# Patient Record
Sex: FEMALE | Race: WHITE | NOT HISPANIC OR LATINO | Employment: UNEMPLOYED | URBAN - METROPOLITAN AREA
[De-identification: names, ages, dates, MRNs, and addresses within clinical notes are randomized per-mention and may not be internally consistent; named-entity substitution may affect disease eponyms.]

---

## 2019-01-07 ENCOUNTER — APPOINTMENT (OUTPATIENT)
Dept: RADIOLOGY | Facility: MEDICAL CENTER | Age: 29
End: 2019-01-07
Attending: EMERGENCY MEDICINE
Payer: MEDICARE

## 2019-01-07 ENCOUNTER — HOSPITAL ENCOUNTER (EMERGENCY)
Facility: MEDICAL CENTER | Age: 29
End: 2019-01-07
Attending: EMERGENCY MEDICINE
Payer: MEDICARE

## 2019-01-07 VITALS
BODY MASS INDEX: 28.64 KG/M2 | TEMPERATURE: 97 F | WEIGHT: 167.77 LBS | HEART RATE: 75 BPM | DIASTOLIC BLOOD PRESSURE: 85 MMHG | RESPIRATION RATE: 18 BRPM | OXYGEN SATURATION: 97 % | SYSTOLIC BLOOD PRESSURE: 128 MMHG | HEIGHT: 64 IN

## 2019-01-07 DIAGNOSIS — R55 SYNCOPE, UNSPECIFIED SYNCOPE TYPE: ICD-10-CM

## 2019-01-07 DIAGNOSIS — S00.83XA CONTUSION OF FACE, INITIAL ENCOUNTER: ICD-10-CM

## 2019-01-07 LAB — HCG SERPL QL: NEGATIVE

## 2019-01-07 PROCEDURE — 70450 CT HEAD/BRAIN W/O DYE: CPT

## 2019-01-07 PROCEDURE — 99284 EMERGENCY DEPT VISIT MOD MDM: CPT | Mod: 25

## 2019-01-07 PROCEDURE — 84703 CHORIONIC GONADOTROPIN ASSAY: CPT

## 2019-01-07 PROCEDURE — 70486 CT MAXILLOFACIAL W/O DYE: CPT

## 2019-01-07 RX ORDER — ONDANSETRON 4 MG/1
4 TABLET, ORALLY DISINTEGRATING ORAL ONCE
Status: DISCONTINUED | OUTPATIENT
Start: 2019-01-07 | End: 2019-01-07 | Stop reason: HOSPADM

## 2019-01-07 RX ORDER — QUETIAPINE FUMARATE 50 MG/1
50 TABLET, FILM COATED ORAL
COMMUNITY

## 2019-01-07 RX ORDER — SERTRALINE HYDROCHLORIDE 100 MG/1
100 TABLET, FILM COATED ORAL DAILY
COMMUNITY

## 2019-01-07 RX ORDER — FLUPHENAZINE HYDROCHLORIDE 5 MG/1
5-15 TABLET ORAL 2 TIMES DAILY
COMMUNITY

## 2019-01-07 RX ORDER — BENZTROPINE MESYLATE 0.5 MG/1
0.5 TABLET ORAL 2 TIMES DAILY
COMMUNITY

## 2019-01-07 RX ORDER — OXCARBAZEPINE 600 MG/1
1200 TABLET, FILM COATED ORAL 2 TIMES DAILY
COMMUNITY

## 2019-01-07 NOTE — ED TRIAGE NOTES
Pt was shopping and started to feel dizzy. Per staff at the store pt had 4 episodes of falling. They states whenever she would stand up she would fall again. Pt hit head on concrete. Pt has lac on nose and swollen R eye. Pt denies pain but states she is nauseous.

## 2019-01-07 NOTE — ED NOTES
Pt rounded on in lobby. Pt medicated for nausea per protocol. Pt to bathroom with tech to get urine sample.

## 2019-01-07 NOTE — ED NOTES
Unable to scan until pregnancy test resulted. Pt unable to give urine specimen.  Blood hcg ordered.

## 2021-07-10 ENCOUNTER — HOSPITAL ENCOUNTER (EMERGENCY)
Dept: HOSPITAL 8 - ED | Age: 31
Discharge: HOME | End: 2021-07-10
Payer: MEDICARE

## 2021-07-10 VITALS — WEIGHT: 145.51 LBS | BODY MASS INDEX: 24.24 KG/M2 | HEIGHT: 65 IN

## 2021-07-10 VITALS — SYSTOLIC BLOOD PRESSURE: 115 MMHG | DIASTOLIC BLOOD PRESSURE: 77 MMHG

## 2021-07-10 DIAGNOSIS — Z76.0: ICD-10-CM

## 2021-07-10 DIAGNOSIS — F20.0: Primary | ICD-10-CM

## 2021-07-10 PROCEDURE — 99283 EMERGENCY DEPT VISIT LOW MDM: CPT

## 2021-07-16 ENCOUNTER — HOSPITAL ENCOUNTER (EMERGENCY)
Facility: MEDICAL CENTER | Age: 31
End: 2021-07-16
Attending: EMERGENCY MEDICINE
Payer: OTHER MISCELLANEOUS

## 2021-07-16 ENCOUNTER — APPOINTMENT (OUTPATIENT)
Dept: RADIOLOGY | Facility: MEDICAL CENTER | Age: 31
End: 2021-07-16
Attending: EMERGENCY MEDICINE
Payer: OTHER MISCELLANEOUS

## 2021-07-16 VITALS
OXYGEN SATURATION: 98 % | TEMPERATURE: 98 F | BODY MASS INDEX: 21.34 KG/M2 | RESPIRATION RATE: 18 BRPM | DIASTOLIC BLOOD PRESSURE: 85 MMHG | HEIGHT: 64 IN | HEART RATE: 102 BPM | WEIGHT: 125 LBS | SYSTOLIC BLOOD PRESSURE: 116 MMHG
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
## 2021-07-16 DIAGNOSIS — Z86.59 HISTORY OF SCHIZOPHRENIA: ICD-10-CM

## 2021-07-16 LAB
ABO GROUP BLD: NORMAL
APTT PPP: 26.7 SEC (ref 24.7–36)
BLD GP AB SCN SERPL QL: NORMAL
ERYTHROCYTE [DISTWIDTH] IN BLOOD BY AUTOMATED COUNT: 38.7 FL (ref 35.9–50)
HCG SERPL QL: NEGATIVE
HCT VFR BLD AUTO: 45.5 % (ref 37–47)
HGB BLD-MCNC: 16.2 G/DL (ref 12–16)
INR PPP: 1.21 (ref 0.87–1.13)
MCH RBC QN AUTO: 30.6 PG (ref 27–33)
MCHC RBC AUTO-ENTMCNC: 35.6 G/DL (ref 33.6–35)
MCV RBC AUTO: 86 FL (ref 81.4–97.8)
PLATELET # BLD AUTO: 228 K/UL (ref 164–446)
PMV BLD AUTO: 10.4 FL (ref 9–12.9)
PROTHROMBIN TIME: 15 SEC (ref 12–14.6)
RBC # BLD AUTO: 5.29 M/UL (ref 4.2–5.4)
RH BLD: NORMAL
WBC # BLD AUTO: 12.6 K/UL (ref 4.8–10.8)

## 2021-07-16 PROCEDURE — 70450 CT HEAD/BRAIN W/O DYE: CPT

## 2021-07-16 PROCEDURE — 85027 COMPLETE CBC AUTOMATED: CPT

## 2021-07-16 PROCEDURE — 84703 CHORIONIC GONADOTROPIN ASSAY: CPT

## 2021-07-16 PROCEDURE — 71045 X-RAY EXAM CHEST 1 VIEW: CPT

## 2021-07-16 PROCEDURE — 86850 RBC ANTIBODY SCREEN: CPT

## 2021-07-16 PROCEDURE — 99284 EMERGENCY DEPT VISIT MOD MDM: CPT

## 2021-07-16 PROCEDURE — 86901 BLOOD TYPING SEROLOGIC RH(D): CPT

## 2021-07-16 PROCEDURE — 85610 PROTHROMBIN TIME: CPT

## 2021-07-16 PROCEDURE — 305948 HCHG GREEN TRAUMA ACT PRE-NOTIFY NO CC

## 2021-07-16 PROCEDURE — 85730 THROMBOPLASTIN TIME PARTIAL: CPT

## 2021-07-16 PROCEDURE — 86900 BLOOD TYPING SEROLOGIC ABO: CPT

## 2021-07-16 RX ORDER — SERTRALINE HYDROCHLORIDE 100 MG/1
100 TABLET, FILM COATED ORAL DAILY
Status: SHIPPED | COMMUNITY
End: 2021-08-05

## 2021-07-16 ASSESSMENT — PAIN DESCRIPTION - PAIN TYPE: TYPE: ACUTE PAIN

## 2021-07-16 ASSESSMENT — PAIN SCALES - WONG BAKER: WONGBAKER_NUMERICALRESPONSE: HURTS A LITTLE MORE
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
## 2021-07-17 NOTE — ED NOTES
Pt discharged to PD custody. Explained discharge instructions. Questions and comments addressed. Pt verbalized understanding of instructions. Pt advised to follow-up with PCP as needed or return to ED for any new or worsening of symptoms. Pt is ambulating well and steady on feet. VS stable.

## 2021-07-17 NOTE — ED NOTES
PD at bedside requesting legal blood draw. Phlebotomist called and advised he will be coming down to ER now.
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
## 2021-07-17 NOTE — ED NOTES
Received report from MARK ANTHONY Cazares. Patient is currently on the monitor. The patient presents with no physical complaints. The patient has rambling speech and is tangential in nature. Difficult to keep the patient on topic of conversation.

## 2021-07-20 ENCOUNTER — HOSPITAL ENCOUNTER (INPATIENT)
Dept: HOSPITAL 8 - 3E | Age: 31
LOS: 20 days | Discharge: HOME | DRG: 885 | End: 2021-08-09
Attending: PSYCHIATRY & NEUROLOGY | Admitting: PSYCHIATRY & NEUROLOGY
Payer: MEDICARE

## 2021-07-20 ENCOUNTER — HOSPITAL ENCOUNTER (EMERGENCY)
Dept: HOSPITAL 8 - ED | Age: 31
Discharge: PSYCHIATRIC OR SUBSTANCE USE FACILITY | End: 2021-07-20
Payer: MEDICARE

## 2021-07-20 VITALS — WEIGHT: 132.28 LBS | BODY MASS INDEX: 21.26 KG/M2 | HEIGHT: 66 IN

## 2021-07-20 VITALS — WEIGHT: 127.87 LBS | HEIGHT: 64 IN | BODY MASS INDEX: 21.83 KG/M2

## 2021-07-20 VITALS — SYSTOLIC BLOOD PRESSURE: 100 MMHG | DIASTOLIC BLOOD PRESSURE: 65 MMHG

## 2021-07-20 VITALS — SYSTOLIC BLOOD PRESSURE: 101 MMHG | DIASTOLIC BLOOD PRESSURE: 72 MMHG

## 2021-07-20 DIAGNOSIS — F11.20: ICD-10-CM

## 2021-07-20 DIAGNOSIS — E87.6: ICD-10-CM
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
## 2021-07-20 DIAGNOSIS — Z91.14: ICD-10-CM

## 2021-07-20 DIAGNOSIS — F15.20: ICD-10-CM

## 2021-07-20 DIAGNOSIS — F25.0: Primary | ICD-10-CM

## 2021-07-20 DIAGNOSIS — Z20.822: ICD-10-CM

## 2021-07-20 DIAGNOSIS — S70.11XA: Primary | ICD-10-CM

## 2021-07-20 DIAGNOSIS — F17.210: ICD-10-CM

## 2021-07-20 DIAGNOSIS — S70.12XA: ICD-10-CM

## 2021-07-20 DIAGNOSIS — Z91.19: ICD-10-CM
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
## 2021-07-20 DIAGNOSIS — F12.90: ICD-10-CM

## 2021-07-20 DIAGNOSIS — F23: ICD-10-CM
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
## 2021-07-20 DIAGNOSIS — Z59.0: ICD-10-CM

## 2021-07-20 LAB
ALBUMIN SERPL-MCNC: 4.1 G/DL (ref 3.4–5)
ALP SERPL-CCNC: 57 U/L (ref 45–117)
ALT SERPL-CCNC: 53 U/L (ref 12–78)
ANION GAP SERPL CALC-SCNC: 8 MMOL/L (ref 5–15)
BASOPHILS # BLD AUTO: 0.1 X10^3/UL (ref 0–0.1)
BASOPHILS NFR BLD AUTO: 0 % (ref 0–1)
BILIRUB SERPL-MCNC: 1.3 MG/DL (ref 0.2–1)
CALCIUM SERPL-MCNC: 9.4 MG/DL (ref 8.5–10.1)
CHLORIDE SERPL-SCNC: 102 MMOL/L (ref 98–107)
CREAT SERPL-MCNC: 0.77 MG/DL (ref 0.55–1.02)
EOSINOPHIL # BLD AUTO: 0 X10^3/UL (ref 0–0.4)
EOSINOPHIL NFR BLD AUTO: 0 % (ref 1–7)
ERYTHROCYTE [DISTWIDTH] IN BLOOD BY AUTOMATED COUNT: 13.2 % (ref 9.6–15.2)
LYMPHOCYTES # BLD AUTO: 2 X10^3/UL (ref 1–3.4)
LYMPHOCYTES NFR BLD AUTO: 15 % (ref 22–44)
MCH RBC QN AUTO: 30.8 PG (ref 27–34.8)
MCHC RBC AUTO-ENTMCNC: 35.4 G/DL (ref 32.4–35.8)
MONOCYTES # BLD AUTO: 2 X10^3/UL (ref 0.2–0.8)
MONOCYTES NFR BLD AUTO: 15 % (ref 2–9)
NEUTROPHILS # BLD AUTO: 9.3 X10^3/UL (ref 1.8–6.8)
NEUTROPHILS NFR BLD AUTO: 69 % (ref 42–75)
PLATELET # BLD AUTO: 220 X10^3/UL (ref 130–400)
PMV BLD AUTO: 8.3 FL (ref 7.4–10.4)
PROT SERPL-MCNC: 7.8 G/DL (ref 6.4–8.2)
RBC # BLD AUTO: 4.65 X10^6/UL (ref 3.82–5.3)
VANCOMYCIN TROUGH SERPL-MCNC: 4.3 MG/DL (ref 2.8–20)

## 2021-07-20 PROCEDURE — U0003 INFECTIOUS AGENT DETECTION BY NUCLEIC ACID (DNA OR RNA); SEVERE ACUTE RESPIRATORY SYNDROME CORONAVIRUS 2 (SARS-COV-2) (CORONAVIRUS DISEASE [COVID-19]), AMPLIFIED PROBE TECHNIQUE, MAKING USE OF HIGH THROUGHPUT TECHNOLOGIES AS DESCRIBED BY CMS-2020-01-R: HCPCS

## 2021-07-20 PROCEDURE — 93005 ELECTROCARDIOGRAM TRACING: CPT

## 2021-07-20 PROCEDURE — 80320 DRUG SCREEN QUANTALCOHOLS: CPT

## 2021-07-20 PROCEDURE — G0480 DRUG TEST DEF 1-7 CLASSES: HCPCS

## 2021-07-20 PROCEDURE — 80329 ANALGESICS NON-OPIOID 1 OR 2: CPT

## 2021-07-20 PROCEDURE — 84703 CHORIONIC GONADOTROPIN ASSAY: CPT

## 2021-07-20 PROCEDURE — 80053 COMPREHEN METABOLIC PANEL: CPT

## 2021-07-20 PROCEDURE — 99406 BEHAV CHNG SMOKING 3-10 MIN: CPT

## 2021-07-20 PROCEDURE — 80307 DRUG TEST PRSMV CHEM ANLYZR: CPT

## 2021-07-20 PROCEDURE — 71045 X-RAY EXAM CHEST 1 VIEW: CPT

## 2021-07-20 PROCEDURE — 99285 EMERGENCY DEPT VISIT HI MDM: CPT

## 2021-07-20 PROCEDURE — 80299 QUANTITATIVE ASSAY DRUG: CPT

## 2021-07-20 PROCEDURE — 85025 COMPLETE CBC W/AUTO DIFF WBC: CPT

## 2021-07-20 PROCEDURE — 36415 COLL VENOUS BLD VENIPUNCTURE: CPT

## 2021-07-20 PROCEDURE — 96372 THER/PROPH/DIAG INJ SC/IM: CPT

## 2021-07-20 RX ADMIN — ZIPRASIDONE HCL PRN MG: 20 CAPSULE ORAL at 21:23

## 2021-07-20 SDOH — ECONOMIC STABILITY - HOUSING INSECURITY: HOMELESSNESS: Z59.0
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
## 2021-07-20 NOTE — NUR
WOKE PT UP TO TAKE MEDS PER MAR, POTASSIUM DISOLVED IN 4 OZ H2O, PT SAYS EYES 
CONTINOUSLY, DOES NOT ANSWER QUESTIONS DRANK WATER COOPERATIVE, AND SAID THANK 
YOU, PT THEN HUDDLED UNDER COVERS AND WENT BACK TO SLEEP

## 2021-07-20 NOTE — NUR
PT ASLEEP WITH EVEN AND UNLABORED RESPIRATIONS. DEB FONTAINE.

-------------------------------------------------------------------------------

Addendum: 07/20/21 at 1120 by JASSON

-------------------------------------------------------------------------------

PT ASLEEP WITH EVEN AND UNLABORED RESPIRATIONS. DEB FONTAINE. SAFETY PRECAUTIONS 
IN PLACE AND SITTER AT BEDSIDE.

## 2021-07-20 NOTE — NUR
PT OFFERED BREAKFAST TRAY, PATIENT SLEEPING WITH EVEN AND UNLABORED 
RESPIRATIONS, SITTER AT BEDSIDE. SAFETY PRECAUTIONS IN PLACE.

## 2021-07-20 NOTE — NUR
pt refusing blood draw. pt became very aggitated and not allowing staff to draw 
blood. dr mesa notified. he states attempt to give im medication, temporarily 
restrain pt if necessary ot give meds/draw blood
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
## 2021-07-20 NOTE — NUR
REPORT TAKEN FROM TASK RN WILLIE, PT SLEEPING IN BED, ALL SECURITY PRECAUTIONS IN 
PLACE, SITTER AT BEDSIDE, MINAL.

## 2021-07-20 NOTE — NUR
PT UP TO BATHROOM TO PROVIDED UA. PT THROW UA CUP ON GROUND STATING "I AINT 
GIVING YOU SHIT" THEN PT PROVEDED TO GO BACK TO BED.

## 2021-07-20 NOTE — NUR
PT PROVIDED WITH LUNCH TRAY. SITTING IN BED EATING. NADN. SITTER AT BEDSIDE. 
SAFETY PRECAUTIONS IN PLACE.

## 2021-07-20 NOTE — NUR
report from radha assumed care of pt, sitter at doorway, lab draw done, pt 
cooperated pt went back to sleep in nad

## 2021-07-20 NOTE — NUR
ASSUMING CARE OF PATIENT AFTER BEDSIDE REPORT FROM NOC CAROLINA RODRIGUEZ. SAFETY 
PRECAUTIONS IN PLACE AND SITTER AT BEDSIDE. PT AGIATED THIS MORNING PACING IN 
ROOM NON-REDIRECTABLE. PT MEDICATED PER EMAR WITH ASSISTANCE OF SECURITY. WILL 
CONTINUE TO MONITOR CLOSELY.

## 2021-07-20 NOTE — NUR
pt sleeping in bed, resps even and unlabored, nadn. security precautions in 
place, sitter at bedside.

## 2021-07-20 NOTE — NUR
pt medciated per order, cooperative and tolerated well. pt sitting in bed, all 
security precautions in place, nadn, sitter at bedside.

## 2021-07-21 VITALS — DIASTOLIC BLOOD PRESSURE: 74 MMHG | SYSTOLIC BLOOD PRESSURE: 109 MMHG

## 2021-07-21 VITALS — DIASTOLIC BLOOD PRESSURE: 65 MMHG | SYSTOLIC BLOOD PRESSURE: 96 MMHG

## 2021-07-21 RX ADMIN — ZIPRASIDONE HYDROCHLORIDE SCH MG: 40 CAPSULE ORAL at 20:08

## 2021-07-21 RX ADMIN — NICOTINE SCH PATCH: 14 PATCH, EXTENDED RELEASE TRANSDERMAL at 09:13

## 2021-07-21 RX ADMIN — POTASSIUM CHLORIDE SCH MEQ: 20 TABLET, EXTENDED RELEASE ORAL at 17:36

## 2021-07-22 VITALS — DIASTOLIC BLOOD PRESSURE: 72 MMHG | SYSTOLIC BLOOD PRESSURE: 110 MMHG

## 2021-07-22 RX ADMIN — POTASSIUM CHLORIDE SCH MEQ: 20 TABLET, EXTENDED RELEASE ORAL at 17:23

## 2021-07-22 RX ADMIN — NICOTINE SCH PATCH: 14 PATCH, EXTENDED RELEASE TRANSDERMAL at 09:07

## 2021-07-22 RX ADMIN — BACITRACIN ZINC SCH APPLIC: 500 OINTMENT TOPICAL at 10:06

## 2021-07-22 RX ADMIN — ZIPRASIDONE HYDROCHLORIDE SCH MG: 40 CAPSULE ORAL at 09:00

## 2021-07-22 RX ADMIN — POTASSIUM CHLORIDE SCH MEQ: 20 TABLET, EXTENDED RELEASE ORAL at 08:00

## 2021-07-22 RX ADMIN — PALIPERIDONE SCH MG: 6 TABLET, FILM COATED, EXTENDED RELEASE ORAL at 10:04

## 2021-07-23 VITALS — DIASTOLIC BLOOD PRESSURE: 82 MMHG | SYSTOLIC BLOOD PRESSURE: 114 MMHG

## 2021-07-23 RX ADMIN — POTASSIUM CHLORIDE SCH MEQ: 20 TABLET, EXTENDED RELEASE ORAL at 17:09

## 2021-07-23 RX ADMIN — BACITRACIN ZINC SCH APPLIC: 500 OINTMENT TOPICAL at 10:21

## 2021-07-23 RX ADMIN — POTASSIUM CHLORIDE SCH MEQ: 20 TABLET, EXTENDED RELEASE ORAL at 09:06

## 2021-07-23 RX ADMIN — PALIPERIDONE SCH MG: 6 TABLET, FILM COATED, EXTENDED RELEASE ORAL at 09:07

## 2021-07-23 RX ADMIN — NICOTINE SCH PATCH: 14 PATCH, EXTENDED RELEASE TRANSDERMAL at 09:05

## 2021-07-24 VITALS — DIASTOLIC BLOOD PRESSURE: 80 MMHG | SYSTOLIC BLOOD PRESSURE: 115 MMHG

## 2021-07-24 VITALS — DIASTOLIC BLOOD PRESSURE: 72 MMHG | SYSTOLIC BLOOD PRESSURE: 104 MMHG

## 2021-07-24 RX ADMIN — NICOTINE SCH PATCH: 14 PATCH, EXTENDED RELEASE TRANSDERMAL at 08:35

## 2021-07-24 RX ADMIN — PALIPERIDONE SCH MG: 6 TABLET, FILM COATED, EXTENDED RELEASE ORAL at 08:36

## 2021-07-24 RX ADMIN — BACITRACIN ZINC SCH APPLIC: 500 OINTMENT TOPICAL at 08:43

## 2021-07-24 RX ADMIN — POTASSIUM CHLORIDE SCH MEQ: 20 TABLET, EXTENDED RELEASE ORAL at 08:35

## 2021-07-25 VITALS — DIASTOLIC BLOOD PRESSURE: 85 MMHG | SYSTOLIC BLOOD PRESSURE: 126 MMHG

## 2021-07-25 VITALS — DIASTOLIC BLOOD PRESSURE: 72 MMHG | SYSTOLIC BLOOD PRESSURE: 118 MMHG

## 2021-07-25 RX ADMIN — ACETAMINOPHEN PRN MG: 325 TABLET, FILM COATED ORAL at 19:48

## 2021-07-25 RX ADMIN — NICOTINE SCH PATCH: 14 PATCH, EXTENDED RELEASE TRANSDERMAL at 08:56

## 2021-07-25 RX ADMIN — BACITRACIN ZINC SCH APPLIC: 500 OINTMENT TOPICAL at 09:37

## 2021-07-25 RX ADMIN — PALIPERIDONE SCH MG: 3 TABLET, FILM COATED, EXTENDED RELEASE ORAL at 08:56

## 2021-07-26 VITALS — SYSTOLIC BLOOD PRESSURE: 109 MMHG | DIASTOLIC BLOOD PRESSURE: 80 MMHG

## 2021-07-26 RX ADMIN — ACETAMINOPHEN PRN MG: 325 TABLET, FILM COATED ORAL at 15:24

## 2021-07-26 RX ADMIN — BACITRACIN ZINC SCH APPLIC: 500 OINTMENT TOPICAL at 10:00

## 2021-07-26 RX ADMIN — PALIPERIDONE SCH MG: 3 TABLET, FILM COATED, EXTENDED RELEASE ORAL at 08:34

## 2021-07-26 RX ADMIN — ACETAMINOPHEN PRN MG: 325 TABLET, FILM COATED ORAL at 08:50

## 2021-07-26 RX ADMIN — NICOTINE SCH PATCH: 14 PATCH, EXTENDED RELEASE TRANSDERMAL at 08:36

## 2021-07-27 VITALS — DIASTOLIC BLOOD PRESSURE: 70 MMHG | SYSTOLIC BLOOD PRESSURE: 106 MMHG

## 2021-07-27 RX ADMIN — NICOTINE SCH PATCH: 14 PATCH, EXTENDED RELEASE TRANSDERMAL at 08:30

## 2021-07-27 RX ADMIN — ACETAMINOPHEN PRN MG: 325 TABLET, FILM COATED ORAL at 19:46

## 2021-07-27 RX ADMIN — BACITRACIN ZINC SCH APPLIC: 500 OINTMENT TOPICAL at 08:48

## 2021-07-27 RX ADMIN — PALIPERIDONE SCH MG: 3 TABLET, FILM COATED, EXTENDED RELEASE ORAL at 08:35

## 2021-07-28 VITALS — DIASTOLIC BLOOD PRESSURE: 65 MMHG | SYSTOLIC BLOOD PRESSURE: 99 MMHG

## 2021-07-28 VITALS — SYSTOLIC BLOOD PRESSURE: 119 MMHG | DIASTOLIC BLOOD PRESSURE: 80 MMHG

## 2021-07-28 RX ADMIN — BACITRACIN ZINC SCH APPLIC: 500 OINTMENT TOPICAL at 08:49

## 2021-07-28 RX ADMIN — ACETAMINOPHEN PRN MG: 325 TABLET, FILM COATED ORAL at 16:09

## 2021-07-28 RX ADMIN — NICOTINE SCH PATCH: 14 PATCH, EXTENDED RELEASE TRANSDERMAL at 08:40

## 2021-07-28 RX ADMIN — PALIPERIDONE SCH MG: 3 TABLET, FILM COATED, EXTENDED RELEASE ORAL at 08:41

## 2021-07-29 VITALS — SYSTOLIC BLOOD PRESSURE: 109 MMHG | DIASTOLIC BLOOD PRESSURE: 73 MMHG

## 2021-07-29 VITALS — DIASTOLIC BLOOD PRESSURE: 64 MMHG | SYSTOLIC BLOOD PRESSURE: 99 MMHG

## 2021-07-29 RX ADMIN — BACITRACIN ZINC SCH APPLIC: 500 OINTMENT TOPICAL at 09:44

## 2021-07-29 RX ADMIN — NICOTINE SCH PATCH: 14 PATCH, EXTENDED RELEASE TRANSDERMAL at 09:39

## 2021-07-29 RX ADMIN — ACETAMINOPHEN PRN MG: 325 TABLET, FILM COATED ORAL at 20:37

## 2021-07-29 RX ADMIN — PALIPERIDONE SCH MG: 3 TABLET, FILM COATED, EXTENDED RELEASE ORAL at 09:38

## 2021-07-30 VITALS — DIASTOLIC BLOOD PRESSURE: 78 MMHG | SYSTOLIC BLOOD PRESSURE: 113 MMHG

## 2021-07-30 VITALS — DIASTOLIC BLOOD PRESSURE: 67 MMHG | SYSTOLIC BLOOD PRESSURE: 101 MMHG

## 2021-07-30 RX ADMIN — NICOTINE SCH PATCH: 14 PATCH, EXTENDED RELEASE TRANSDERMAL at 08:03

## 2021-07-30 RX ADMIN — CARBAMAZEPINE SCH MG: 200 TABLET, EXTENDED RELEASE ORAL at 21:00

## 2021-07-30 RX ADMIN — ACETAMINOPHEN PRN MG: 325 TABLET, FILM COATED ORAL at 13:06

## 2021-07-30 RX ADMIN — PALIPERIDONE SCH MG: 3 TABLET, FILM COATED, EXTENDED RELEASE ORAL at 08:03

## 2021-07-30 RX ADMIN — BACITRACIN ZINC SCH APPLIC: 500 OINTMENT TOPICAL at 09:00

## 2021-07-30 RX ADMIN — CARBAMAZEPINE SCH MG: 200 TABLET, EXTENDED RELEASE ORAL at 13:06

## 2021-07-30 RX ADMIN — ZIPRASIDONE HCL PRN MG: 20 CAPSULE ORAL at 08:04

## 2021-07-31 VITALS — DIASTOLIC BLOOD PRESSURE: 81 MMHG | SYSTOLIC BLOOD PRESSURE: 119 MMHG

## 2021-07-31 VITALS — DIASTOLIC BLOOD PRESSURE: 79 MMHG | SYSTOLIC BLOOD PRESSURE: 111 MMHG

## 2021-07-31 RX ADMIN — NICOTINE SCH PATCH: 14 PATCH, EXTENDED RELEASE TRANSDERMAL at 08:55

## 2021-07-31 RX ADMIN — BACITRACIN ZINC SCH APPLIC: 500 OINTMENT TOPICAL at 09:45

## 2021-07-31 RX ADMIN — CARBAMAZEPINE SCH MG: 200 TABLET, EXTENDED RELEASE ORAL at 21:00

## 2021-07-31 RX ADMIN — PALIPERIDONE SCH MG: 3 TABLET, FILM COATED, EXTENDED RELEASE ORAL at 08:54

## 2021-07-31 RX ADMIN — CARBAMAZEPINE SCH MG: 200 TABLET, EXTENDED RELEASE ORAL at 08:54

## 2021-08-01 VITALS — SYSTOLIC BLOOD PRESSURE: 96 MMHG | DIASTOLIC BLOOD PRESSURE: 58 MMHG

## 2021-08-01 VITALS — DIASTOLIC BLOOD PRESSURE: 69 MMHG | SYSTOLIC BLOOD PRESSURE: 103 MMHG

## 2021-08-01 RX ADMIN — PALIPERIDONE SCH MG: 3 TABLET, FILM COATED, EXTENDED RELEASE ORAL at 08:38

## 2021-08-01 RX ADMIN — CARBAMAZEPINE SCH MG: 200 TABLET, EXTENDED RELEASE ORAL at 08:38

## 2021-08-01 RX ADMIN — NICOTINE SCH PATCH: 14 PATCH, EXTENDED RELEASE TRANSDERMAL at 08:38

## 2021-08-01 RX ADMIN — ACETAMINOPHEN PRN MG: 325 TABLET, FILM COATED ORAL at 13:31

## 2021-08-01 RX ADMIN — CARBAMAZEPINE SCH MG: 200 TABLET, EXTENDED RELEASE ORAL at 21:00

## 2021-08-01 RX ADMIN — BACITRACIN ZINC SCH APPLIC: 500 OINTMENT TOPICAL at 08:39

## 2021-08-02 VITALS — DIASTOLIC BLOOD PRESSURE: 55 MMHG | SYSTOLIC BLOOD PRESSURE: 79 MMHG

## 2021-08-02 VITALS — DIASTOLIC BLOOD PRESSURE: 72 MMHG | SYSTOLIC BLOOD PRESSURE: 100 MMHG

## 2021-08-02 RX ADMIN — BACITRACIN ZINC SCH APPLIC: 500 OINTMENT TOPICAL at 09:00

## 2021-08-02 RX ADMIN — DIVALPROEX SODIUM SCH MG: 500 TABLET, DELAYED RELEASE ORAL at 21:00

## 2021-08-02 RX ADMIN — CARBAMAZEPINE SCH MG: 200 TABLET, EXTENDED RELEASE ORAL at 09:00

## 2021-08-02 RX ADMIN — NICOTINE POLACRILEX PRN MG: 2 GUM, CHEWING ORAL at 10:22

## 2021-08-02 RX ADMIN — PALIPERIDONE SCH MG: 3 TABLET, FILM COATED, EXTENDED RELEASE ORAL at 09:07

## 2021-08-03 VITALS — SYSTOLIC BLOOD PRESSURE: 99 MMHG | DIASTOLIC BLOOD PRESSURE: 69 MMHG

## 2021-08-03 VITALS — DIASTOLIC BLOOD PRESSURE: 81 MMHG | SYSTOLIC BLOOD PRESSURE: 119 MMHG

## 2021-08-03 RX ADMIN — DIVALPROEX SODIUM SCH MG: 500 TABLET, DELAYED RELEASE ORAL at 09:00

## 2021-08-03 RX ADMIN — NICOTINE POLACRILEX PRN MG: 2 GUM, CHEWING ORAL at 20:00

## 2021-08-03 RX ADMIN — BACITRACIN ZINC SCH APPLIC: 500 OINTMENT TOPICAL at 09:00

## 2021-08-03 RX ADMIN — NICOTINE POLACRILEX PRN MG: 2 GUM, CHEWING ORAL at 07:59

## 2021-08-03 RX ADMIN — ACETAMINOPHEN PRN MG: 325 TABLET, FILM COATED ORAL at 07:58

## 2021-08-03 RX ADMIN — DIVALPROEX SODIUM SCH MG: 250 TABLET, DELAYED RELEASE ORAL at 21:00

## 2021-08-04 VITALS — DIASTOLIC BLOOD PRESSURE: 68 MMHG | SYSTOLIC BLOOD PRESSURE: 98 MMHG

## 2021-08-04 VITALS — DIASTOLIC BLOOD PRESSURE: 72 MMHG | SYSTOLIC BLOOD PRESSURE: 104 MMHG

## 2021-08-04 RX ADMIN — BACITRACIN ZINC SCH APPLIC: 500 OINTMENT TOPICAL at 09:00

## 2021-08-04 RX ADMIN — PALIPERIDONE SCH MG: 3 TABLET, FILM COATED, EXTENDED RELEASE ORAL at 07:24

## 2021-08-04 RX ADMIN — NICOTINE POLACRILEX PRN MG: 2 GUM, CHEWING ORAL at 18:22

## 2021-08-04 RX ADMIN — DIVALPROEX SODIUM SCH MG: 250 TABLET, DELAYED RELEASE ORAL at 09:00

## 2021-08-04 RX ADMIN — NICOTINE POLACRILEX PRN MG: 2 GUM, CHEWING ORAL at 07:25

## 2021-08-04 RX ADMIN — DIVALPROEX SODIUM SCH MG: 250 TABLET, DELAYED RELEASE ORAL at 20:41

## 2021-08-05 VITALS — DIASTOLIC BLOOD PRESSURE: 77 MMHG | SYSTOLIC BLOOD PRESSURE: 117 MMHG

## 2021-08-05 VITALS — SYSTOLIC BLOOD PRESSURE: 105 MMHG | DIASTOLIC BLOOD PRESSURE: 70 MMHG

## 2021-08-05 RX ADMIN — NICOTINE POLACRILEX PRN MG: 2 GUM, CHEWING ORAL at 14:31

## 2021-08-05 RX ADMIN — BACITRACIN ZINC SCH APPLIC: 500 OINTMENT TOPICAL at 09:00

## 2021-08-05 RX ADMIN — NICOTINE POLACRILEX PRN MG: 2 GUM, CHEWING ORAL at 08:59

## 2021-08-05 RX ADMIN — DIVALPROEX SODIUM SCH MG: 250 TABLET, DELAYED RELEASE ORAL at 09:00

## 2021-08-05 RX ADMIN — PALIPERIDONE SCH MG: 3 TABLET, FILM COATED, EXTENDED RELEASE ORAL at 08:59

## 2021-08-06 VITALS — DIASTOLIC BLOOD PRESSURE: 79 MMHG | SYSTOLIC BLOOD PRESSURE: 112 MMHG

## 2021-08-06 VITALS — DIASTOLIC BLOOD PRESSURE: 65 MMHG | SYSTOLIC BLOOD PRESSURE: 105 MMHG

## 2021-08-06 RX ADMIN — NICOTINE POLACRILEX PRN MG: 2 GUM, CHEWING ORAL at 07:40

## 2021-08-06 RX ADMIN — NICOTINE POLACRILEX PRN MG: 2 GUM, CHEWING ORAL at 11:02

## 2021-08-06 RX ADMIN — PALIPERIDONE SCH MG: 6 TABLET, FILM COATED, EXTENDED RELEASE ORAL at 07:39

## 2021-08-06 RX ADMIN — BACITRACIN ZINC SCH APPLIC: 500 OINTMENT TOPICAL at 08:21

## 2021-08-07 VITALS — SYSTOLIC BLOOD PRESSURE: 114 MMHG | DIASTOLIC BLOOD PRESSURE: 92 MMHG

## 2021-08-07 VITALS — SYSTOLIC BLOOD PRESSURE: 109 MMHG | DIASTOLIC BLOOD PRESSURE: 76 MMHG

## 2021-08-07 RX ADMIN — BACITRACIN ZINC SCH APPLIC: 500 OINTMENT TOPICAL at 08:54

## 2021-08-07 RX ADMIN — NICOTINE POLACRILEX PRN MG: 2 GUM, CHEWING ORAL at 16:25

## 2021-08-07 RX ADMIN — NICOTINE POLACRILEX PRN MG: 2 GUM, CHEWING ORAL at 20:40

## 2021-08-07 RX ADMIN — NICOTINE POLACRILEX PRN MG: 2 GUM, CHEWING ORAL at 08:54

## 2021-08-07 RX ADMIN — PALIPERIDONE SCH MG: 6 TABLET, FILM COATED, EXTENDED RELEASE ORAL at 08:32

## 2021-08-08 VITALS — SYSTOLIC BLOOD PRESSURE: 118 MMHG | DIASTOLIC BLOOD PRESSURE: 74 MMHG

## 2021-08-08 VITALS — SYSTOLIC BLOOD PRESSURE: 106 MMHG | DIASTOLIC BLOOD PRESSURE: 72 MMHG

## 2021-08-08 VITALS — SYSTOLIC BLOOD PRESSURE: 98 MMHG | DIASTOLIC BLOOD PRESSURE: 59 MMHG

## 2021-08-08 RX ADMIN — BACITRACIN ZINC SCH APPLIC: 500 OINTMENT TOPICAL at 08:53

## 2021-08-08 RX ADMIN — NICOTINE POLACRILEX PRN MG: 2 GUM, CHEWING ORAL at 14:37

## 2021-08-08 RX ADMIN — NICOTINE POLACRILEX PRN MG: 2 GUM, CHEWING ORAL at 08:41

## 2021-08-08 RX ADMIN — ACETAMINOPHEN PRN MG: 325 TABLET, FILM COATED ORAL at 08:54

## 2021-08-08 RX ADMIN — ACETAMINOPHEN PRN MG: 325 TABLET, FILM COATED ORAL at 20:18

## 2021-08-08 RX ADMIN — PALIPERIDONE SCH MG: 6 TABLET, FILM COATED, EXTENDED RELEASE ORAL at 08:41

## 2021-08-08 RX ADMIN — NICOTINE POLACRILEX PRN MG: 2 GUM, CHEWING ORAL at 18:30

## 2021-08-09 VITALS — SYSTOLIC BLOOD PRESSURE: 113 MMHG | DIASTOLIC BLOOD PRESSURE: 77 MMHG

## 2021-08-09 RX ADMIN — PALIPERIDONE SCH MG: 6 TABLET, FILM COATED, EXTENDED RELEASE ORAL at 08:28

## 2021-08-09 RX ADMIN — NICOTINE POLACRILEX PRN MG: 2 GUM, CHEWING ORAL at 08:46

## 2021-08-09 RX ADMIN — BACITRACIN ZINC SCH APPLIC: 500 OINTMENT TOPICAL at 09:00

## 2023-06-20 NOTE — ED PROVIDER NOTES
ED Provider Note    CHIEF COMPLAINT  Chief Complaint   Patient presents with   • Syncope   • Nausea       HPI  Bev Deleon is a 28 y.o. female who presents to the emergency department brought in after a syncopal episode.  The patient says that she got up this morning and did not eat but she took her morning medication which consists of multiple psychiatric medications.  She then developed dizziness and nauseousness and she ended up falling over and striking her head on the ground which was a cement surface.  She is developed some ecchymosis and swelling around the right orbit and a tiny laceration over the bridge of the nose so was brought to the emergency department for evaluation.  She says she has had symptoms similar to this when she does not eat and she takes a lot of pills but this is the first time she is passed out from this.  She says she is not otherwise been ill    REVIEW OF SYSTEMS no neck pain no chest pain palpitations cough difficulty breathing or hemoptysis.  No abdominal or lower back pain.  All other systems negative    PAST MEDICAL HISTORY  Past Medical History:   Diagnosis Date   • Psychiatric disorder        FAMILY HISTORY  History reviewed. No pertinent family history.    SOCIAL HISTORY  Social History     Social History   • Marital status: Single     Spouse name: N/A   • Number of children: N/A   • Years of education: N/A     Social History Main Topics   • Smoking status: Current Every Day Smoker     Packs/day: 1.00     Years: 9.00     Types: Cigarettes   • Smokeless tobacco: Never Used   • Alcohol use Yes      Comment: occ   • Drug use: Yes     Types: Inhaled      Comment: marijuana occ   • Sexual activity: Not on file     Other Topics Concern   • Not on file     Social History Narrative   • No narrative on file       SURGICAL HISTORY  History reviewed. No pertinent surgical history.    CURRENT MEDICATIONS  Home Medications     Reviewed by Joe Monsivais (Pharmacy Tech) on 01/07/19 at  Pt is A&O x4 and has been resting in bed. Pt has a flat affect and has been in a poor mood since admission. Pt is admitted with anemia. Pt received 1 unit of blood on day shift, and 1 unit of blood throughout the night. Pt's hgb is 6.8 this AM. Pt currently has another unit of blood going. Pt has 500 mg tylenol Q4 hours PRN for pain as well as Pana Q4 hours PRN. Pt states that he would rather have the tylenol over the Norco. Pt has received one dose of Tylenol throughout the night for generalized pain. Pt states that he feels pain when he moves around. Writer offered the pt a clean gown/scrub pants and socks as the pt is still wearing his jeans and shoes in bed. Pt states that he is comfortable and wants to leave his jeans and shoes on in bed. Pt was educated to use the urinal for I&Os and instructed on the importance of hospital socks to prevent falls. Pt has a bed alarm in place and was instructed to use the call light if the pt needs to get up. Pt is on a full liquid diet and calls out for pt needs. All questions and concerns addressed. Bed locked in the lowest position and call light is in reach.     Problem: Discharge Planning  Goal: Discharge to home or other facility with appropriate resources  6/20/2023 0059 by Ti Briones RN  Outcome: Progressing     Problem: Safety - Adult  Goal: Free from fall injury  6/20/2023 0059 by Ti Briones RN  Outcome: Progressing     Problem: Pain  Goal: Verbalizes/displays adequate comfort level or baseline comfort level  6/20/2023 0059 by Ti Briones RN  Outcome: Progressing     Problem: Cardiovascular - Adult  Goal: Maintains optimal cardiac output and hemodynamic stability  Outcome: Progressing     Problem: Skin/Tissue Integrity - Adult  Goal: Skin integrity remains intact  Outcome: Progressing     Problem: Skin/Tissue Integrity - Adult  Goal: Incisions, wounds, or drain sites healing without S/S of infection  Outcome: Progressing     Problem: Musculoskeletal - "1551  Med List Status: Complete   Medication Last Dose Status   benztropine (COGENTIN) 0.5 MG Tab 1/7/2019 Active   fluphenazine (PROLIXIN) 5 MG tablet 1/7/2019 Active   oxcarbazepine (TRILEPTAL) 600 MG tablet 1/7/2019 Active   Paliperidone Palmitate (INVEGA TRINZA) 819 MG/2.625ML Suspension 10/2018 Active   quetiapine (SEROQUEL) 50 MG tablet 1/6/2019 Active   sertraline (ZOLOFT) 100 MG Tab 1/7/2019 Active                ALLERGIES  Allergies   Allergen Reactions   • Haldol [Haloperidol]      rash       PHYSICAL EXAM  VITAL SIGNS: /75   Pulse 79   Temp 36.1 °C (97 °F) (Temporal)   Resp 18   Ht 1.626 m (5' 4\")   Wt 76.1 kg (167 lb 12.3 oz)   SpO2 97%   BMI 28.80 kg/m²    Oxygen saturation is interpreted as adequate  Constitutional: Awake and verbal and nontoxic-appearing  HENT: There is a small hematoma and ecchymosis on the right side of the forehead and lateral to the right orbit and there is a 2 mm very superficial laceration over the bridge of the nose  Eyes: Pupils round and reactive extra ocular motion present  Neck: Trachea midline no JVD no C-spine tenderness  Cardiovascular: Regular rate and rhythm  Lungs: Clear and equal bilaterally with no apparent difficulty breathing  Abdomen/Back: No tenderness of the thoracic or lumbar spine  Skin: Warm and dry  Musculoskeletal: No acute bony deformity  Neurologic: Awake verbal ambulatory without difficulty    Radiology  CT-MAXILLOFACIAL W/O PLUS RECONS   Final Result         1.  No acute fractures identified.      2.  Soft tissue edema overlying the right orbit and nasal bones      CT-HEAD W/O   Final Result      No acute intracranial findings.               MEDICAL DECISION MAKING and DISPOSITION  In the emergency department the patient generally looks well except for contusion to the face.  She says she is feeling a lot better.  I think that is going to be safe for her to go home and she has a ride here to take her home.  I have advised her to eat " regular meals especially to have something in her stomach when she takes a lot of medication.  If she has any recurrent or new or worsening symptoms she is to return here for recheck    IMPRESSION  1.  Syncopal episode  2.  Contusion to the face      Electronically signed by: Wilfredo Verduzco, 1/7/2019 4:09 PM